# Patient Record
Sex: MALE | Race: WHITE | NOT HISPANIC OR LATINO | Employment: FULL TIME | URBAN - METROPOLITAN AREA
[De-identification: names, ages, dates, MRNs, and addresses within clinical notes are randomized per-mention and may not be internally consistent; named-entity substitution may affect disease eponyms.]

---

## 2024-02-01 ENCOUNTER — LAB (OUTPATIENT)
Dept: LAB | Facility: LAB | Age: 39
End: 2024-02-01
Payer: COMMERCIAL

## 2024-02-01 DIAGNOSIS — E78.5 HYPERLIPIDEMIA, UNSPECIFIED: Primary | ICD-10-CM

## 2024-02-01 LAB
CHOLEST SERPL-MCNC: 330 MG/DL (ref 133–200)
CHOLEST/HDLC SERPL: 6.5 {RATIO}
HDLC SERPL-MCNC: 51 MG/DL
LDLC SERPL CALC-MCNC: 230 MG/DL (ref 65–130)
TRIGL SERPL-MCNC: 243 MG/DL (ref 40–150)

## 2024-02-01 PROCEDURE — 80061 LIPID PANEL: CPT

## 2024-02-01 PROCEDURE — 36415 COLL VENOUS BLD VENIPUNCTURE: CPT

## 2024-03-27 ENCOUNTER — OFFICE VISIT (OUTPATIENT)
Dept: PRIMARY CARE | Facility: CLINIC | Age: 39
End: 2024-03-27
Payer: COMMERCIAL

## 2024-03-27 VITALS
HEART RATE: 88 BPM | DIASTOLIC BLOOD PRESSURE: 102 MMHG | HEIGHT: 70 IN | SYSTOLIC BLOOD PRESSURE: 140 MMHG | OXYGEN SATURATION: 98 % | BODY MASS INDEX: 36.22 KG/M2 | WEIGHT: 253 LBS

## 2024-03-27 DIAGNOSIS — E78.2 MIXED HYPERLIPIDEMIA: Primary | ICD-10-CM

## 2024-03-27 DIAGNOSIS — Z00.00 ROUTINE GENERAL MEDICAL EXAMINATION AT A HEALTH CARE FACILITY: ICD-10-CM

## 2024-03-27 DIAGNOSIS — R03.0 ELEVATED BLOOD PRESSURE READING: ICD-10-CM

## 2024-03-27 LAB
POC APPEARANCE, URINE: CLEAR
POC BILIRUBIN, URINE: NEGATIVE
POC BLOOD, URINE: NEGATIVE
POC COLOR, URINE: YELLOW
POC GLUCOSE, URINE: NEGATIVE MG/DL
POC KETONES, URINE: NEGATIVE MG/DL
POC LEUKOCYTES, URINE: NEGATIVE
POC NITRITE,URINE: NEGATIVE
POC PH, URINE: 5.5 PH
POC PROTEIN, URINE: NEGATIVE MG/DL
POC SPECIFIC GRAVITY, URINE: 1.02
POC UROBILINOGEN, URINE: 0.2 EU/DL

## 2024-03-27 PROCEDURE — 81003 URINALYSIS AUTO W/O SCOPE: CPT | Performed by: FAMILY MEDICINE

## 2024-03-27 PROCEDURE — 99395 PREV VISIT EST AGE 18-39: CPT | Performed by: FAMILY MEDICINE

## 2024-03-27 PROCEDURE — 1036F TOBACCO NON-USER: CPT | Performed by: FAMILY MEDICINE

## 2024-03-27 RX ORDER — FLUTICASONE PROPIONATE 50 MCG
1 SPRAY, SUSPENSION (ML) NASAL EVERY 24 HOURS
COMMUNITY

## 2024-03-27 RX ORDER — IBUPROFEN 200 MG
TABLET ORAL
COMMUNITY

## 2024-03-27 RX ORDER — CREATININE 100 %
POWDER (GRAM) MISCELLANEOUS
COMMUNITY

## 2024-03-27 RX ORDER — OMEGA-3-ACID ETHYL ESTERS 1 G/1
1 CAPSULE, LIQUID FILLED ORAL 2 TIMES DAILY
COMMUNITY

## 2024-03-27 RX ORDER — CALCIUM CARBONATE 200(500)MG
1 TABLET,CHEWABLE ORAL EVERY 24 HOURS
COMMUNITY

## 2024-03-27 RX ORDER — BISMUTH SUBSALICYLATE 262 MG
1 TABLET,CHEWABLE ORAL DAILY
COMMUNITY

## 2024-03-27 RX ORDER — ROSUVASTATIN CALCIUM 10 MG/1
10 TABLET, COATED ORAL DAILY
Qty: 100 TABLET | Refills: 3 | Status: SHIPPED | OUTPATIENT
Start: 2024-03-27 | End: 2025-05-01

## 2024-03-27 ASSESSMENT — PROMIS GLOBAL HEALTH SCALE
RATE_AVERAGE_PAIN: 0
CARRYOUT_PHYSICAL_ACTIVITIES: COMPLETELY
RATE_PHYSICAL_HEALTH: GOOD
RATE_SOCIAL_SATISFACTION: GOOD
RATE_GENERAL_HEALTH: GOOD
EMOTIONAL_PROBLEMS: SOMETIMES
RATE_AVERAGE_FATIGUE: MODERATE
RATE_MENTAL_HEALTH: GOOD
RATE_QUALITY_OF_LIFE: GOOD
CARRYOUT_SOCIAL_ACTIVITIES: GOOD

## 2024-03-27 ASSESSMENT — ENCOUNTER SYMPTOMS
ENDOCRINE NEGATIVE: 1
GASTROINTESTINAL NEGATIVE: 1
RESPIRATORY NEGATIVE: 1
MUSCULOSKELETAL NEGATIVE: 1
HEMATOLOGIC/LYMPHATIC NEGATIVE: 1
CONSTITUTIONAL NEGATIVE: 1
EYES NEGATIVE: 1
PSYCHIATRIC NEGATIVE: 1
NEUROLOGICAL NEGATIVE: 1

## 2024-03-27 ASSESSMENT — PAIN SCALES - GENERAL: PAINLEVEL: 1

## 2024-03-27 ASSESSMENT — PATIENT HEALTH QUESTIONNAIRE - PHQ9
1. LITTLE INTEREST OR PLEASURE IN DOING THINGS: NOT AT ALL
SUM OF ALL RESPONSES TO PHQ9 QUESTIONS 1 AND 2: 0
2. FEELING DOWN, DEPRESSED OR HOPELESS: NOT AT ALL

## 2024-03-27 NOTE — PROGRESS NOTES
[facility]: [facility]  PHYSICAL EXAM      Akash Mullen is a 38 y.o. male that is presenting today for Annual Exam (Patient is having pain in his hand he would like to discuss/dd).     Subjective   Pt here follow annual exam    Had lipids by cardiology   he's a vegetarian.       Occ exercise.        Review of Systems   Constitutional: Negative.    HENT: Negative.     Eyes: Negative.    Respiratory: Negative.     Gastrointestinal: Negative.    Endocrine: Negative.    Genitourinary: Negative.    Musculoskeletal: Negative.    Skin: Negative.    Neurological: Negative.    Hematological: Negative.    Psychiatric/Behavioral: Negative.         History    Past Medical History:   Diagnosis Date    Anxiety December 2022     Past Surgical History:   Procedure Laterality Date    WISDOM TOOTH EXTRACTION  May 2021     Family History   Problem Relation Name Age of Onset    Hypertension Father Soren Mullen      No Known Allergies  Current Outpatient Medications on File Prior to Visit   Medication Sig Dispense Refill    beneprotein as directed Orally      calcium carbonate (Tums) 200 mg calcium chewable tablet Chew 1 tablet (500 mg) once every 24 hours.      creatinine, bulk, 100 % powder as directed Orally      fluticasone (Flonase) 50 mcg/actuation nasal spray Administer 1 spray into each nostril once every 24 hours.      multivitamin tablet Take 1 tablet by mouth once daily.      omega-3 acid ethyl esters (Lovaza) 1 gram capsule Take 1 capsule (1 g) by mouth 2 times a day.       No current facility-administered medications on file prior to visit.     Immunization History   Administered Date(s) Administered    Pfizer Purple Cap SARS-CoV-2 03/31/2021, 04/24/2021, 11/24/2021     Patient's medical history was reviewed and updated either before or during this encounter.    Objective   Vitals:    03/27/24 1323   BP: (!) 140/102   Pulse: 88   SpO2: 98%      Physical Exam  Constitutional:       General: He is  not in acute distress.     Appearance: Normal appearance. He is not ill-appearing.   HENT:      Right Ear: Tympanic membrane, ear canal and external ear normal. There is no impacted cerumen.      Left Ear: Tympanic membrane, ear canal and external ear normal.      Nose: Nose normal.      Mouth/Throat:      Pharynx: Oropharynx is clear. No posterior oropharyngeal erythema.   Eyes:      Extraocular Movements: Extraocular movements intact.      Pupils: Pupils are equal, round, and reactive to light.   Neck:      Thyroid: No thyroid mass or thyroid tenderness.      Vascular: No carotid bruit.   Cardiovascular:      Rate and Rhythm: Normal rate and regular rhythm.      Pulses:           Radial pulses are 2+ on the right side and 2+ on the left side.        Dorsalis pedis pulses are 2+ on the right side and 2+ on the left side.      Heart sounds: Normal heart sounds. No murmur heard.     No friction rub. No gallop.   Pulmonary:      Effort: Pulmonary effort is normal.      Breath sounds: Normal breath sounds.   Abdominal:      General: Bowel sounds are normal.      Palpations: Abdomen is soft. There is no hepatomegaly or splenomegaly.      Tenderness: There is no abdominal tenderness.   Musculoskeletal:      Cervical back: Normal range of motion. No tenderness.      Right lower leg: No edema.      Left lower leg: No edema.      Comments: No gross abnormalities    Lymphadenopathy:      Cervical: No cervical adenopathy.      Upper Body:      Right upper body: No supraclavicular adenopathy.      Left upper body: No supraclavicular adenopathy.   Skin:     General: Skin is warm.      Capillary Refill: Capillary refill takes 2 to 3 seconds.   Neurological:      General: No focal deficit present.      Mental Status: He is alert.      Cranial Nerves: Cranial nerves 2-12 are intact.      Coordination: Coordination is intact.      Gait: Gait is intact.      Comments: No obvious neurological deficits    Psychiatric:         Mood and  Affect: Mood and affect normal.             Assessment/Plan      There is no problem list on file for this patient.    Diagnoses and all orders for this visit:  Mixed hyperlipidemia  -     rosuvastatin (Crestor) 10 mg tablet; Take 1 tablet (10 mg) by mouth once daily.  -     Comprehensive Metabolic Panel; Future  -     Lipid Panel; Future  Routine general medical examination at a health care facility  -     POCT UA Automated manually resulted  Elevated blood pressure reading  Start home blood pressure monitoring      The patient was encouraged to ensure that any/all documentation is accurate and up to date, and that our office be provided a copy in the event that anything changes.    Bruce Corley MD

## 2024-06-21 ENCOUNTER — TELEPHONE (OUTPATIENT)
Dept: PRIMARY CARE | Facility: CLINIC | Age: 39
End: 2024-06-21
Payer: COMMERCIAL

## 2024-08-23 ENCOUNTER — LAB (OUTPATIENT)
Dept: LAB | Facility: LAB | Age: 39
End: 2024-08-23
Payer: COMMERCIAL

## 2024-08-23 DIAGNOSIS — E78.2 MIXED HYPERLIPIDEMIA: ICD-10-CM

## 2024-08-23 LAB
ALBUMIN SERPL BCP-MCNC: 3.8 G/DL (ref 3.4–5)
ALP SERPL-CCNC: 97 U/L (ref 33–120)
ALT SERPL W P-5'-P-CCNC: 47 U/L (ref 10–52)
ANION GAP SERPL CALC-SCNC: 14 MMOL/L (ref 10–20)
AST SERPL W P-5'-P-CCNC: 28 U/L (ref 9–39)
BILIRUB SERPL-MCNC: 0.6 MG/DL (ref 0–1.2)
BUN SERPL-MCNC: 15 MG/DL (ref 6–23)
CALCIUM SERPL-MCNC: 8.8 MG/DL (ref 8.6–10.6)
CHLORIDE SERPL-SCNC: 105 MMOL/L (ref 98–107)
CHOLEST SERPL-MCNC: 166 MG/DL (ref 0–199)
CHOLESTEROL/HDL RATIO: 3
CO2 SERPL-SCNC: 25 MMOL/L (ref 21–32)
CREAT SERPL-MCNC: 0.98 MG/DL (ref 0.5–1.3)
EGFRCR SERPLBLD CKD-EPI 2021: >90 ML/MIN/1.73M*2
GLUCOSE SERPL-MCNC: 94 MG/DL (ref 74–99)
HDLC SERPL-MCNC: 55.9 MG/DL
LDLC SERPL CALC-MCNC: 83 MG/DL
NON HDL CHOLESTEROL: 110 MG/DL (ref 0–149)
POTASSIUM SERPL-SCNC: 4.3 MMOL/L (ref 3.5–5.3)
PROT SERPL-MCNC: 6.4 G/DL (ref 6.4–8.2)
SODIUM SERPL-SCNC: 140 MMOL/L (ref 136–145)
TRIGL SERPL-MCNC: 138 MG/DL (ref 0–149)
VLDL: 28 MG/DL (ref 0–40)

## 2024-08-23 PROCEDURE — 80053 COMPREHEN METABOLIC PANEL: CPT

## 2024-08-23 PROCEDURE — 36415 COLL VENOUS BLD VENIPUNCTURE: CPT

## 2024-08-23 PROCEDURE — 80061 LIPID PANEL: CPT

## 2025-05-06 ENCOUNTER — OFFICE VISIT (OUTPATIENT)
Dept: PRIMARY CARE | Facility: CLINIC | Age: 40
End: 2025-05-06
Payer: COMMERCIAL

## 2025-05-06 VITALS
HEART RATE: 90 BPM | SYSTOLIC BLOOD PRESSURE: 125 MMHG | OXYGEN SATURATION: 97 % | BODY MASS INDEX: 36.98 KG/M2 | HEIGHT: 70 IN | WEIGHT: 258.34 LBS | DIASTOLIC BLOOD PRESSURE: 90 MMHG

## 2025-05-06 DIAGNOSIS — R03.0 ELEVATED BP WITHOUT DIAGNOSIS OF HYPERTENSION: Primary | ICD-10-CM

## 2025-05-06 DIAGNOSIS — E78.2 MIXED HYPERLIPIDEMIA: ICD-10-CM

## 2025-05-06 PROCEDURE — 1036F TOBACCO NON-USER: CPT | Performed by: STUDENT IN AN ORGANIZED HEALTH CARE EDUCATION/TRAINING PROGRAM

## 2025-05-06 PROCEDURE — 99213 OFFICE O/P EST LOW 20 MIN: CPT | Performed by: STUDENT IN AN ORGANIZED HEALTH CARE EDUCATION/TRAINING PROGRAM

## 2025-05-06 PROCEDURE — 3008F BODY MASS INDEX DOCD: CPT | Performed by: STUDENT IN AN ORGANIZED HEALTH CARE EDUCATION/TRAINING PROGRAM

## 2025-05-06 RX ORDER — ROSUVASTATIN CALCIUM 10 MG/1
10 TABLET, COATED ORAL DAILY
Qty: 90 TABLET | Refills: 1 | Status: SHIPPED | OUTPATIENT
Start: 2025-05-06 | End: 2025-11-02

## 2025-05-06 ASSESSMENT — PAIN SCALES - GENERAL: PAINLEVEL_OUTOF10: 0-NO PAIN

## 2025-05-06 ASSESSMENT — PATIENT HEALTH QUESTIONNAIRE - PHQ9
1. LITTLE INTEREST OR PLEASURE IN DOING THINGS: NOT AT ALL
2. FEELING DOWN, DEPRESSED OR HOPELESS: NOT AT ALL
SUM OF ALL RESPONSES TO PHQ9 QUESTIONS 1 AND 2: 0

## 2025-05-06 NOTE — PROGRESS NOTES
"Subjective   Patient ID: Akash Mullen is a 39 y.o. male who presents for Med Refill (Rosuvastatin/Establishing care with Dr. Shah 07/03/25).    HPI  38 yo male here for medication refill  HLD  On crestor 10 mg  Denies side effects  Started about 1 year ago  2. Elevated bp  124/88 this morning  Has been elevated in office before but usually improves    Review of Systems  All pertinent positive symptoms are included in the history of present illness.    All other systems have been reviewed and are negative and noncontributory to this patient's current ailments.     Allergies[1]     Immunization History   Administered Date(s) Administered    COVID-19, mRNA, LNP-S, PF, 30 mcg/0.3 mL dose 03/31/2021, 04/24/2021, 11/24/2021    Pfizer COVID-19 vaccine, 12 years and older, (30mcg/0.3mL) (Comirnaty) 11/04/2024       Objective   Vitals:    05/06/25 1342 05/06/25 1359   BP: (!) 156/100 125/90   Pulse: 90    SpO2: 97%    Weight: 117 kg (258 lb 5.4 oz)    Height: 1.778 m (5' 10\")        Physical Exam  CONSTITUTIONAL - well nourished, well developed, looks like stated age, in no acute distress  SKIN - normal skin color and pigmentation. No rash, lesions, or nodules visualized  HEAD - no trauma, normocephalic  EYES - extraocular muscles are intact  ENT - atraumatic  NECK - no neck mass was observed  LUNGS - CTA B/L  CARDIAC - regular rate and regular rhythm  ABDOMEN - no organomegaly, soft, nontender, nondistended  EXTREMITIES - no edema, no deformities  MSK - moves limbs equally  NEUROLOGICAL - alert, oriented and no focal signs  PSYCHIATRIC - alert, pleasant and cordial, age-appropriate  IMMUNOLOGIC - no cervical lymphadenopathy     Assessment/Plan   38 yo male here for medication refill  HLD  Continue crestor 10 mg  2. Elevated bp  Check at home between now and apt in July    RTC in July to see Dr. Shah       [1] No Known Allergies    "

## 2025-07-03 ENCOUNTER — OFFICE VISIT (OUTPATIENT)
Dept: PRIMARY CARE | Facility: CLINIC | Age: 40
End: 2025-07-03
Payer: COMMERCIAL

## 2025-07-03 VITALS
BODY MASS INDEX: 37.22 KG/M2 | OXYGEN SATURATION: 98 % | SYSTOLIC BLOOD PRESSURE: 146 MMHG | HEART RATE: 82 BPM | WEIGHT: 260 LBS | DIASTOLIC BLOOD PRESSURE: 96 MMHG | HEIGHT: 70 IN

## 2025-07-03 DIAGNOSIS — Z00.00 WELLNESS EXAMINATION: Primary | ICD-10-CM

## 2025-07-03 DIAGNOSIS — E78.01 FAMILIAL HYPERCHOLESTEROLEMIA: ICD-10-CM

## 2025-07-03 DIAGNOSIS — R79.9 ABNORMAL FINDING OF BLOOD CHEMISTRY, UNSPECIFIED: ICD-10-CM

## 2025-07-03 DIAGNOSIS — I10 HTN (HYPERTENSION), BENIGN: ICD-10-CM

## 2025-07-03 DIAGNOSIS — Z13.1 SCREENING FOR DIABETES MELLITUS: ICD-10-CM

## 2025-07-03 DIAGNOSIS — R06.83 SNORING: ICD-10-CM

## 2025-07-03 DIAGNOSIS — Z13.29 SCREENING FOR THYROID DISORDER: ICD-10-CM

## 2025-07-03 DIAGNOSIS — Z11.59 NEED FOR HEPATITIS C SCREENING TEST: ICD-10-CM

## 2025-07-03 DIAGNOSIS — Z11.59 SCREENING FOR VIRAL DISEASE: ICD-10-CM

## 2025-07-03 DIAGNOSIS — E55.9 VITAMIN D DEFICIENCY: ICD-10-CM

## 2025-07-03 DIAGNOSIS — Z30.09 VASECTOMY EVALUATION: ICD-10-CM

## 2025-07-03 DIAGNOSIS — Z11.4 SCREENING FOR HUMAN IMMUNODEFICIENCY VIRUS: ICD-10-CM

## 2025-07-03 DIAGNOSIS — Z12.5 SCREENING FOR MALIGNANT NEOPLASM OF PROSTATE: ICD-10-CM

## 2025-07-03 DIAGNOSIS — Z13.6 ENCOUNTER FOR SCREENING FOR CARDIOVASCULAR DISORDERS: ICD-10-CM

## 2025-07-03 PROCEDURE — 3080F DIAST BP >= 90 MM HG: CPT | Performed by: FAMILY MEDICINE

## 2025-07-03 PROCEDURE — 3077F SYST BP >= 140 MM HG: CPT | Performed by: FAMILY MEDICINE

## 2025-07-03 PROCEDURE — 99396 PREV VISIT EST AGE 40-64: CPT | Performed by: FAMILY MEDICINE

## 2025-07-03 PROCEDURE — 1036F TOBACCO NON-USER: CPT | Performed by: FAMILY MEDICINE

## 2025-07-03 PROCEDURE — 99214 OFFICE O/P EST MOD 30 MIN: CPT | Performed by: FAMILY MEDICINE

## 2025-07-03 PROCEDURE — 3008F BODY MASS INDEX DOCD: CPT | Performed by: FAMILY MEDICINE

## 2025-07-03 RX ORDER — ROSUVASTATIN CALCIUM 10 MG/1
10 TABLET, COATED ORAL DAILY
Qty: 90 TABLET | Refills: 1 | Status: SHIPPED | OUTPATIENT
Start: 2025-07-03 | End: 2025-12-30

## 2025-07-03 ASSESSMENT — PATIENT HEALTH QUESTIONNAIRE - PHQ9
SUM OF ALL RESPONSES TO PHQ9 QUESTIONS 1 AND 2: 0
1. LITTLE INTEREST OR PLEASURE IN DOING THINGS: NOT AT ALL
2. FEELING DOWN, DEPRESSED OR HOPELESS: NOT AT ALL

## 2025-07-03 ASSESSMENT — PAIN SCALES - GENERAL: PAINLEVEL_OUTOF10: 0-NO PAIN

## 2025-07-03 NOTE — PROGRESS NOTES
39-year-old presents to clinic to establish care for yearly physical follow-up chronic medical conditions    Health Maintenance:  Eats a plant-based diet.  Exercises regularly.  Does not drink, smoke, use illicit substances.  Otherwise up-to-date on all routine health maintenance screenings.  Up-to-date on recommended immunizations.  Due for yearly lab work.        Hyperlipidemia:  Elevated LDL greater than 225 on multiple occasions currently on Crestor 10 mg with good response of LDL less than 100 likely familial hypercholesterolemia.     Hypertension: Elevated blood pressure readings on multiple visits patient may have underlying sleep apnea however not currently on antihypertensives    Snoring:  Patient complains of snoring excessive daytime sleepiness and elevated blood pressure has never had sleep study      All pertinent positive symptoms are included in history of present illness.    All other systems have been reviewed and are negative and noncontributory to this patient's current ailments.      CONSTITUTIONAL - INAD. Not ill appearing.  SKIN - No lesions or rashes visualized. Good skin turgor.  HEENT- Head is atraumatic and normocephalic. Nasal turbinates are nonerythematous and without drainage. .  RESP - CTAB. No wheezing, rhonchi, or crackles.   CARDIAC - RRR. No murmurs, gallops, or rubs.  ABDOMEN - Soft, nontender, nondistended. No organomegaly.  NEURO - CNs 2-12 grossly intact.  PSYCH - Normal mood and affect        1. Wellness examination (Primary)  Health and wellness topics discussed today.  Recommended eating a varied and healthy diet and made dietary recommendations, also discussed exercise and exercising regularly 150 minutes a week.  Immunizations recommended and updated.  Health screening guidelines discussed with patient including possible recommendations such as colon cancer screening, breast cancer screening, prostate cancer screening, lung cancer screening, bone densitometry, AAA  screening, and other wellness topics.  Yearly lab work ordered/reviewed today.  - CBC and Auto Differential; Future  - Comprehensive Metabolic Panel; Future  - Lipid Panel; Future  - Hemoglobin A1C; Future  - TSH with reflex to Free T4 if abnormal; Future  - Vitamin D 25-Hydroxy,Total (for eval of Vitamin D levels); Future  - Prostate Specific Antigen, Screen; Future  - CBC and Auto Differential  - Comprehensive Metabolic Panel  - Lipid Panel  - Hemoglobin A1C  - TSH with reflex to Free T4 if abnormal  - Vitamin D 25-Hydroxy,Total (for eval of Vitamin D levels)  - Prostate Specific Antigen, Screen    2. Abnormal finding of blood chemistry, unspecified    - CBC and Auto Differential; Future  - Comprehensive Metabolic Panel; Future  - Lipid Panel; Future  - Hemoglobin A1C; Future  - TSH with reflex to Free T4 if abnormal; Future  - Vitamin D 25-Hydroxy,Total (for eval of Vitamin D levels); Future  - Prostate Specific Antigen, Screen; Future  - CBC and Auto Differential  - Comprehensive Metabolic Panel  - Lipid Panel  - Hemoglobin A1C  - TSH with reflex to Free T4 if abnormal  - Vitamin D 25-Hydroxy,Total (for eval of Vitamin D levels)  - Prostate Specific Antigen, Screen    3. Screening for thyroid disorder    - CBC and Auto Differential; Future  - Comprehensive Metabolic Panel; Future  - Lipid Panel; Future  - Hemoglobin A1C; Future  - TSH with reflex to Free T4 if abnormal; Future  - Vitamin D 25-Hydroxy,Total (for eval of Vitamin D levels); Future  - Prostate Specific Antigen, Screen; Future  - CBC and Auto Differential  - Comprehensive Metabolic Panel  - Lipid Panel  - Hemoglobin A1C  - TSH with reflex to Free T4 if abnormal  - Vitamin D 25-Hydroxy,Total (for eval of Vitamin D levels)  - Prostate Specific Antigen, Screen    4. Screening for diabetes mellitus    - CBC and Auto Differential; Future  - Comprehensive Metabolic Panel; Future  - Lipid Panel; Future  - Hemoglobin A1C; Future  - TSH with reflex to Free T4  if abnormal; Future  - Vitamin D 25-Hydroxy,Total (for eval of Vitamin D levels); Future  - Prostate Specific Antigen, Screen; Future  - CBC and Auto Differential  - Comprehensive Metabolic Panel  - Lipid Panel  - Hemoglobin A1C  - TSH with reflex to Free T4 if abnormal  - Vitamin D 25-Hydroxy,Total (for eval of Vitamin D levels)  - Prostate Specific Antigen, Screen    5. Screening for viral disease    - CBC and Auto Differential; Future  - Comprehensive Metabolic Panel; Future  - Lipid Panel; Future  - Hemoglobin A1C; Future  - TSH with reflex to Free T4 if abnormal; Future  - Vitamin D 25-Hydroxy,Total (for eval of Vitamin D levels); Future  - Prostate Specific Antigen, Screen; Future  - CBC and Auto Differential  - Comprehensive Metabolic Panel  - Lipid Panel  - Hemoglobin A1C  - TSH with reflex to Free T4 if abnormal  - Vitamin D 25-Hydroxy,Total (for eval of Vitamin D levels)  - Prostate Specific Antigen, Screen    6. Encounter for screening for cardiovascular disorders    - CBC and Auto Differential; Future  - Comprehensive Metabolic Panel; Future  - Lipid Panel; Future  - Hemoglobin A1C; Future  - TSH with reflex to Free T4 if abnormal; Future  - Vitamin D 25-Hydroxy,Total (for eval of Vitamin D levels); Future  - Prostate Specific Antigen, Screen; Future  - CBC and Auto Differential  - Comprehensive Metabolic Panel  - Lipid Panel  - Hemoglobin A1C  - TSH with reflex to Free T4 if abnormal  - Vitamin D 25-Hydroxy,Total (for eval of Vitamin D levels)  - Prostate Specific Antigen, Screen    7. Screening for malignant neoplasm of prostate    - CBC and Auto Differential; Future  - Comprehensive Metabolic Panel; Future  - Lipid Panel; Future  - Hemoglobin A1C; Future  - TSH with reflex to Free T4 if abnormal; Future  - Vitamin D 25-Hydroxy,Total (for eval of Vitamin D levels); Future  - Prostate Specific Antigen, Screen; Future  - CBC and Auto Differential  - Comprehensive Metabolic Panel  - Lipid Panel  -  Hemoglobin A1C  - TSH with reflex to Free T4 if abnormal  - Vitamin D 25-Hydroxy,Total (for eval of Vitamin D levels)  - Prostate Specific Antigen, Screen    8. Vitamin D deficiency    - CBC and Auto Differential; Future  - Comprehensive Metabolic Panel; Future  - Lipid Panel; Future  - Hemoglobin A1C; Future  - TSH with reflex to Free T4 if abnormal; Future  - Vitamin D 25-Hydroxy,Total (for eval of Vitamin D levels); Future  - Prostate Specific Antigen, Screen; Future  - CBC and Auto Differential  - Comprehensive Metabolic Panel  - Lipid Panel  - Hemoglobin A1C  - TSH with reflex to Free T4 if abnormal  - Vitamin D 25-Hydroxy,Total (for eval of Vitamin D levels)  - Prostate Specific Antigen, Screen    9. Screening for human immunodeficiency virus    - HIV 1/2 Antigen/Antibody Screen with Reflex to Confirmation; Future  - HIV 1/2 Antigen/Antibody Screen with Reflex to Confirmation    10. Familial hypercholesterolemia  Discussed with patient about the natural history and course of hyperlipidemia.      Recommend lifestyle interventions including lower carbohydrate, lower saturated fat diet as well as increasing aerobic and resistance training exercise to at least 30 minutes daily 3 times a week, hopefully more.    Discussed medication options including the use of statin medications as well as the side effects of these medications.  Discussed goal LDL of <100 for primary prevention and <70 for secondary prevention.    Discussed the cost benefit analysis of lowering cholesterol and how it will help prevent heart attacks and strokes in the future.     Discussed secondary risk stratification with CT Cardiac Scoring and utilizing the PREVENT calculator to determine if statin use will benefit patient on an individual basis, and may have discussed an informed shared decision to de-prescribe statin if patient may not be likely benefitting from one.     If medication was prescribed or continued, the appropriate lab work was  ordered for monitoring.    Due to FH will get lipoprotein a and apolipoprotein B to guide treatment decision of how aggressive LDL lowering we would like    Could consider PCSK9 inhibitor if statin intolerant in the future    - rosuvastatin (Crestor) 10 mg tablet; Take 1 tablet (10 mg) by mouth once daily.  Dispense: 90 tablet; Refill: 1  - CT cardiac scoring wo IV contrast; Future  - Lipoprotein A (LPA); Future  - Apolipoprotein B; Future  - Lipoprotein A (LPA)  - Apolipoprotein B    11. HTN (hypertension), benign  Had a discussion with the patient about hypertension.  Discussed the natural history and course of hypertension and need for controlling blood pressure.  Discussed the cost benefit of treating hypertension with medication and how lowering blood pressure to goal levels will help reduce the risk of heart attacks and strokes in the future.     I recommend lifestyle interventions including regular cardio aerobic exercise 30 minutes daily at least 3 times a week, hopefully more.  Recommend dietary interventions to help lower blood pressure.    Recommend patient get a blood pressure cuff and begin measuring blood pressure at home and keeping a log.  Advised the patient bring the log with them at their next visit so that we can find out the average blood pressure reading and determine whether or not the treatment is working.    If patient is already taking medication, I recommended continuing or changing medication depending on blood pressure control.    Appropriate lab work was ordered as needed for appropriate monitoring.    Will rule out sleep apnea before initiating treatment  - Home sleep apnea test (HSAT); Future    12. Snoring  Spoke about the natural history and course of sleep apnea.       Spoke about the signs and symptoms of sleep apnea including chronic fatigue, excessive daytime sleepiness, snoring, the appearance of stopping breathing while sleeping, morning headaches, and the need for frequent  napping.    Spoke about the evaluation and treatment of sleep apnea including the need for either home sleep study or evaluation had a sleep center with a polysomnogram.  Spoke about treatment options including lifestyle interventions, weight loss, positional therapy, CPAP machine/AutoPap machine, surgical correction, as well as implantable devices like the inspire device.    Spoke about the possible long-term side effects of untreated sleep apnea with the patient including the increased risk for cardiovascular disease, lung disease, difficulty losing weight, high fasting sugars and diabetes, and risk for metabolic syndrome.    We will start with a home sleep study or polysomnogram and proceed with treatment from there if necessary.    13. Vasectomy evaluation    - Referral to Urology; Future    14. Need for hepatitis C screening test    - Hepatitis C antibody; Future  - Hepatitis C antibody

## 2025-07-31 ENCOUNTER — APPOINTMENT (OUTPATIENT)
Dept: UROLOGY | Facility: CLINIC | Age: 40
End: 2025-07-31
Payer: COMMERCIAL

## 2025-07-31 DIAGNOSIS — Z30.09 VASECTOMY EVALUATION: Primary | ICD-10-CM

## 2025-07-31 PROCEDURE — 99204 OFFICE O/P NEW MOD 45 MIN: CPT

## 2025-07-31 PROCEDURE — 81002 URINALYSIS NONAUTO W/O SCOPE: CPT

## 2025-07-31 ASSESSMENT — PAIN SCALES - GENERAL: PAINLEVEL_OUTOF10: 0-NO PAIN

## 2025-07-31 NOTE — PROGRESS NOTES
Chief complaint:  No chief complaint on file.  Referring physician:  Juan Shah DO     SUBJECTIVE:    Medical history:   has a past medical history of Anxiety (December 2022) and Hypertension (7/1/2024).   Surgical history:   has a past surgical history that includes [place] tooth extraction (May 2021).  Family history:  family history includes Hypertension in his father.  Social history:   reports that he quit smoking about 2 years ago. His smoking use included cigarettes. He started smoking about 7 years ago. He has a 1.8 pack-year smoking history. He has been exposed to tobacco smoke. He quit smokeless tobacco use about 9 years ago.  His smokeless tobacco use included chew. He reports current alcohol use. He reports that he does not currently use drugs after having used the following drugs: Marijuana.    Medications:    Current Outpatient Medications   Medication Instructions    beneprotein as directed Orally    creatinine, bulk, 100 % powder as directed Orally    multivitamin tablet 1 tablet, Daily    omega-3 acid ethyl esters (LOVAZA) 1 g, 2 times daily    rosuvastatin (CRESTOR) 10 mg, oral, Daily      Allergies:    RX Allergies[1]     ROS:  14-point review of systems negative except as noted above.      HPI:  Akash Mullen is a 39 y.o. malewho presents for initial evaluation of vasectomy evaluation  The patient reports having 1 child, has completed childbearing, their partner agrees, and the current contraceptive method is condoms      OBJECTIVE:  There were no vitals taken for this visit.There is no height or weight on file to calculate BMI.    Physical exam  General:  No acute distress  HEENT:  EOMI  CV:  Regular rate  Pulm:  Nonlabored respirations  Abd:  Soft, non-distended  :  No suprapubic or CVA tenderness  MSK:  No contractures  Neuro:  Motor intact  Psych:  Appropriate affect    Labs:    I have personally reviewed your lab tests  Lab Results   Component Value Date    WBC 11.5 (H) 12/22/2022     "HGB 15.7 12/22/2022    HCT 47.4 12/22/2022     12/22/2022    ALT 47 08/23/2024    AST 28 08/23/2024     08/23/2024    K 4.3 08/23/2024     08/23/2024    CREATININE 0.98 08/23/2024    BUN 15 08/23/2024    CO2 25 08/23/2024   No results found for: \"URINECULTURE\" No results found for: \"PSA\"    Imaging:    I have personally reviewed images    ASSESSMENT:   Akash Mullen is a 39 y.o. male presenting for vasectomy evaluation    The patient is explained about the vasectomy procedure, noting the risk of spontaneous reversal, irreversibility, chronic pain, hematoma, and infection of the surgical site. They must continue using contraception until a semen analysis confirms the success of the procedure.   PLAN:  vasectomy  Problem List Items Addressed This Visit    None       Follow-up vasectomy  G [id number]  Visit complexity inherent to evaluation and management (E&M) associated with medical care services that serve as the continuing focal point for all needed health care services and/or with medical care services that are part of ongoing care related to a patient's single, serious condition or a complex condition.    Onur Alegre MD    Problem List Items Addressed This Visit    None            [1] No Known Allergies    "

## 2025-08-18 ENCOUNTER — PROCEDURE VISIT (OUTPATIENT)
Dept: SLEEP MEDICINE | Facility: HOSPITAL | Age: 40
End: 2025-08-18
Payer: COMMERCIAL

## 2025-08-18 DIAGNOSIS — I10 HTN (HYPERTENSION), BENIGN: ICD-10-CM

## 2025-08-28 ENCOUNTER — APPOINTMENT (OUTPATIENT)
Dept: UROLOGY | Facility: CLINIC | Age: 40
End: 2025-08-28
Payer: COMMERCIAL

## 2025-10-23 ENCOUNTER — APPOINTMENT (OUTPATIENT)
Dept: UROLOGY | Facility: CLINIC | Age: 40
End: 2025-10-23
Payer: COMMERCIAL